# Patient Record
Sex: FEMALE | Race: BLACK OR AFRICAN AMERICAN | NOT HISPANIC OR LATINO | URBAN - METROPOLITAN AREA
[De-identification: names, ages, dates, MRNs, and addresses within clinical notes are randomized per-mention and may not be internally consistent; named-entity substitution may affect disease eponyms.]

---
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
## 2018-07-25 ENCOUNTER — HOSPITAL ENCOUNTER (EMERGENCY)
Facility: HOSPITAL | Age: 18
Discharge: HOME | End: 2018-07-25
Attending: EMERGENCY MEDICINE | Admitting: EMERGENCY MEDICINE
Payer: COMMERCIAL

## 2018-07-25 ENCOUNTER — APPOINTMENT (EMERGENCY)
Dept: RADIOLOGY | Facility: HOSPITAL | Age: 18
End: 2018-07-25
Payer: COMMERCIAL

## 2018-07-25 VITALS
TEMPERATURE: 98.1 F | OXYGEN SATURATION: 100 % | SYSTOLIC BLOOD PRESSURE: 119 MMHG | HEIGHT: 64 IN | WEIGHT: 112.88 LBS | DIASTOLIC BLOOD PRESSURE: 74 MMHG | BODY MASS INDEX: 19.27 KG/M2 | RESPIRATION RATE: 17 BRPM | HEART RATE: 68 BPM

## 2018-07-25 DIAGNOSIS — M25.562 LEFT ANTERIOR KNEE PAIN: Primary | ICD-10-CM

## 2018-07-25 PROCEDURE — 99283 EMERGENCY DEPT VISIT LOW MDM: CPT

## 2018-07-25 PROCEDURE — 73562 X-RAY EXAM OF KNEE 3: CPT

## 2018-07-25 RX ORDER — IBUPROFEN 400 MG/1
400 TABLET ORAL ONCE
Status: COMPLETED | OUTPATIENT
Start: 2018-07-25 | End: 2018-07-25

## 2018-07-25 RX ADMIN — IBUPROFEN 400 MG: 400 TABLET ORAL at 10:44

## 2018-07-25 NOTE — DISCHARGE INSTRUCTIONS
Wear the Ace wrap while walking around  Keep her leg elevated, and apply ice while at rest   Take ibuprofen (Motrin, Advil) or acetaminophen (Tylenol) as needed for pain, as per the instructions  Knee Pain   WHAT YOU NEED TO KNOW:   Knee pain may start suddenly, or it may be a long-term problem  You may have pain on the side, front, or back of your knee  You may have knee stiffness and swelling  You may hear popping sounds or feel like your knee is giving way or locking up as you walk  You may feel pain when you sit, stand, walk, or climb up and down stairs  Knee pain can be caused by conditions such as obesity, inflammation, or strains or tears in ligaments or tendons  DISCHARGE INSTRUCTIONS:   Follow up with your healthcare provider within 24 hours or as directed: You may need follow-up treatments, such as steroid injections to decrease pain  Write down your questions so you remember to ask them during your visits  Self-care:   · Rest  your knee so it can heal  Limit activities that increase your pain  · Ice  can help reduce swelling  Wrap ice in a towel and put it on your knee for as long and as often as directed  · Compression  with a brace or bandage can help reduce swelling  Use a brace or bandage only as directed  · Elevation  helps decrease pain and swelling  Elevate your knee while you are sitting or lying down  Prop your leg on pillows to keep your knee above the level of your heart  Medicines:   · NSAIDs  help decrease swelling and pain or fever  This medicine is available with or without a doctor's order  NSAIDs can cause stomach bleeding or kidney problems in certain people  If you take blood thinner medicine, always ask your healthcare provider if NSAIDs are safe for you  Always read the medicine label and follow directions  · Acetaminophen  decreases pain and fever  It is available without a doctor's order  Ask how much to take and when to take it  Follow directions  Acetaminophen can cause liver damage if not taken correctly  · Take your medicine as directed  Contact your healthcare provider if you think your medicine is not helping or if you have side effects  Tell him or her if you are allergic to any medicine  Keep a list of the medicines, vitamins, and herbs you take  Include the amounts, and when and why you take them  Bring the list or the pill bottles to follow-up visits  Carry your medicine list with you in case of an emergency  Exercise as directed: You may need to see a physical therapist or do recommended exercises to improve movement and decrease your pain  You may be directed to walk, swim, or ride a bike  Follow your exercise plan exactly as directed to avoid further injury  Contact your healthcare provider if:   · You have questions or concerns about your condition or care  Return to the emergency department if:   · Your pain is worse, even after treatment  · You cannot bend or straighten your leg completely  · The swelling around your knee does not go down even with treatment  · Your knee is painful and hot to the touch  © 2017 [employer]  Information is for End User's use only and may not be sold, redistributed or otherwise used for commercial purposes  All illustrations and images included in CareNotes® are the copyrighted property of [employer]  or [employer]  The above information is an  only  It is not intended as medical advice for individual conditions or treatments  Talk to your doctor, nurse or pharmacist before following any medical regimen to see if it is safe and effective for you

## 2018-07-25 NOTE — ED PROVIDER NOTES
History  Chief Complaint   Patient presents with    Knee Pain     Pt reports left knee pain that began two days ago, denies any initial injury  HPI    None       History reviewed  No pertinent past medical history  History reviewed  No pertinent surgical history  History reviewed  No pertinent family history  I have reviewed and agree with the history as documented  Social History   Substance Use Topics    Smoking status: Never Smoker    Smokeless tobacco: Never Used    Alcohol use No        Review of Systems    Physical Exam  Physical Exam   Constitutional: She is oriented to person, place, and time  She appears well-developed and well-nourished  No distress  HENT:   Head: Normocephalic and atraumatic  Eyes: Conjunctivae are normal  Pupils are equal, round, and reactive to light  Neck: Normal range of motion  No tracheal deviation present  Cardiovascular: Normal rate, regular rhythm and intact distal pulses  Pulses:       Dorsalis pedis pulses are 2+ on the right side  Pulmonary/Chest: Effort normal  No respiratory distress  Musculoskeletal: She exhibits no edema  Right knee: She exhibits decreased range of motion (slightly limited flexion due to pain ) and swelling (mild, anterior)  She exhibits no effusion, no ecchymosis, no deformity, no erythema, no LCL laxity, normal patellar mobility and no MCL laxity  Tenderness (anterior) found  No patellar tendon tenderness noted  Neurological: She is alert and oriented to person, place, and time  GCS eye subscore is 4  GCS verbal subscore is 5  GCS motor subscore is 6  Skin: Skin is warm and dry  Psychiatric: She has a normal mood and affect  Her behavior is normal    Nursing note and vitals reviewed        Vital Signs  ED Triage Vitals   Temperature Pulse Respirations Blood Pressure SpO2   07/25/18 0910 07/25/18 0908 07/25/18 0908 07/25/18 0908 07/25/18 0908   98 1 °F (36 7 °C) 68 17 119/74 100 %      Temp Source Heart Rate Source Patient Position - Orthostatic VS BP Location FiO2 (%)   07/25/18 0910 07/25/18 0908 07/25/18 0908 07/25/18 0908 --   Oral Monitor Lying Right arm       Pain Score       07/25/18 0908       Worst Possible Pain           Vitals:    07/25/18 0908   BP: 119/74   Pulse: 68   Patient Position - Orthostatic VS: Lying       Visual Acuity      ED Medications  Medications   ibuprofen (MOTRIN) tablet 400 mg (not administered)       Diagnostic Studies  Results Reviewed     None                 XR knee 3 views left non injury    (Results Pending)              Procedures  Procedures       Phone Contacts  ED Phone Contact    ED Course                               MDM  Number of Diagnoses or Management Options  Left anterior knee pain: new and requires workup  Diagnosis management comments: This is a 47-year-old female who presents here today with left knee pain  She states a couple of mornings ago she woke up with pain and swelling, with any feeling "stiff, and hard to bend  She states she iced it throughout the day and it felt better by the evening  She was fine for a day or two, but woke up yesterday with recurrent pain  She states despite icing it she is still having pain, swelling, and difficulties moving it this morning  She states it hurts when walking around on it  She denies any known injuries to the knee  She denies any prior problems to the knee, or any issues with any other joints  She denies any redness, fevers, rashes, other systemic symptoms  She is two weeks postpartum, and had no problems with pregnancy or delivery  There is no calf pain or tenderness, and she states all of her pain is anterior  She has taken Motrin twice with mild improvement of the pain  Her persistent pain this morning is what prompted her to come in for evaluation today  ROS: Otherwise negative, unless stated as above  She is well-appearing, in no acute distress  She has anterior swelling with diffuse anterior tenderness  There is no erythema or warmth  She does have tenderness with range of motion of the knee, and is slightly limited with flexion due to pain  There is no ligamentous laxity  There is no calf tenderness or swelling  She is neurovascularly intact distally  This is most likely sprain or strain, or other unrecognized injury to the knee  I am not concerned about DVT or septic arthritis  X-rays were obtained and reviewed by myself, and show some soft tissue swelling, but no bony abnormalities or significant effusion  I discussed with the patient findings, treatment at home, follow-up, and indications for return, and she expresses understanding with this plan  Amount and/or Complexity of Data Reviewed  Tests in the radiology section of CPT®: ordered and reviewed  Independent visualization of images, tracings, or specimens: yes      CritCare Time    Disposition  Final diagnoses:   Left anterior knee pain     Time reflects when diagnosis was documented in both MDM as applicable and the Disposition within this note     Time User Action Codes Description Comment    7/25/2018 10:41 AM Jose Thomas Forward Add [M25 [id number]] Left anterior knee pain       ED Disposition     ED Disposition Condition Comment    Discharge  Aura Marks discharge to home/self care  Condition at discharge: Good        Follow-up Information     Follow up With Specialties Details Why [facility] Schedule an appointment as soon as possible for a visit to follow up on your knee [address] [facility] [address] [address]  [phone]          Patient's Medications    No medications on file     No discharge procedures on file      ED Provider  Electronically Signed by           Art Feliz MD  07/29/18 5887

## 2019-04-30 ENCOUNTER — HOSPITAL ENCOUNTER (EMERGENCY)
Facility: HOSPITAL | Age: 19
Discharge: HOME | End: 2019-04-30
Attending: EMERGENCY MEDICINE | Admitting: EMERGENCY MEDICINE
Payer: COMMERCIAL

## 2019-04-30 VITALS
HEART RATE: 106 BPM | BODY MASS INDEX: 18.92 KG/M2 | TEMPERATURE: 99.2 F | WEIGHT: 110.23 LBS | DIASTOLIC BLOOD PRESSURE: 67 MMHG | RESPIRATION RATE: 18 BRPM | SYSTOLIC BLOOD PRESSURE: 132 MMHG | OXYGEN SATURATION: 100 %

## 2019-04-30 DIAGNOSIS — J11.1 INFLUENZA-LIKE ILLNESS: Primary | ICD-10-CM

## 2019-04-30 PROCEDURE — 99283 EMERGENCY DEPT VISIT LOW MDM: CPT

## 2019-04-30 PROCEDURE — 99283 EMERGENCY DEPT VISIT LOW MDM: CPT | Performed by: PHYSICIAN ASSISTANT

## 2019-04-30 RX ORDER — IBUPROFEN 600 MG/1
600 TABLET ORAL EVERY 6 HOURS PRN
Qty: 30 TABLET | Refills: 0 | Status: SHIPPED | OUTPATIENT
Start: 2019-04-30 | End: 2021-05-01

## 2019-04-30 RX ORDER — FEXOFENADINE HCL AND PSEUDOEPHEDRINE HCI 60; 120 MG/1; MG/1
1 TABLET, EXTENDED RELEASE ORAL 2 TIMES DAILY PRN
Qty: 14 TABLET | Refills: 0 | Status: SHIPPED | OUTPATIENT
Start: 2019-04-30 | End: 2019-05-07

## 2019-04-30 RX ORDER — GUAIFENESIN AND CODEINE PHOSPHATE 100; 10 MG/5ML; MG/5ML
5 SOLUTION ORAL 3 TIMES DAILY PRN
Qty: 90 ML | Refills: 0 | Status: SHIPPED | OUTPATIENT
Start: 2019-04-30 | End: 2019-05-07

## 2019-04-30 RX ORDER — ONDANSETRON 4 MG/1
4 TABLET, ORALLY DISINTEGRATING ORAL EVERY 6 HOURS PRN
Qty: 12 TABLET | Refills: 0 | Status: SHIPPED | OUTPATIENT
Start: 2019-04-30 | End: 2021-05-01

## 2021-04-24 ENCOUNTER — HOSPITAL ENCOUNTER (EMERGENCY)
Facility: HOSPITAL | Age: 21
Discharge: HOME | End: 2021-04-24
Attending: EMERGENCY MEDICINE | Admitting: EMERGENCY MEDICINE
Payer: COMMERCIAL

## 2021-04-24 VITALS
WEIGHT: 118 LBS | OXYGEN SATURATION: 98 % | HEART RATE: 85 BPM | RESPIRATION RATE: 16 BRPM | SYSTOLIC BLOOD PRESSURE: 110 MMHG | TEMPERATURE: 98.3 F | DIASTOLIC BLOOD PRESSURE: 68 MMHG | BODY MASS INDEX: 20.25 KG/M2

## 2021-04-24 DIAGNOSIS — Z11.52 ENCOUNTER FOR SCREENING FOR COVID-19: Primary | ICD-10-CM

## 2021-04-24 LAB
EXT PREG TEST URINE: NEGATIVE
EXT. CONTROL ED NAV: NORMAL
SARS-COV-2 RNA RESP QL NAA+PROBE: NEGATIVE

## 2021-04-24 PROCEDURE — 96372 THER/PROPH/DIAG INJ SC/IM: CPT

## 2021-04-24 PROCEDURE — 99284 EMERGENCY DEPT VISIT MOD MDM: CPT | Performed by: PHYSICIAN ASSISTANT

## 2021-04-24 PROCEDURE — U0003 INFECTIOUS AGENT DETECTION BY NUCLEIC ACID (DNA OR RNA); SEVERE ACUTE RESPIRATORY SYNDROME CORONAVIRUS 2 (SARS-COV-2) (CORONAVIRUS DISEASE [COVID-19]), AMPLIFIED PROBE TECHNIQUE, MAKING USE OF HIGH THROUGHPUT TECHNOLOGIES AS DESCRIBED BY CMS-2020-01-R: HCPCS | Performed by: PHYSICIAN ASSISTANT

## 2021-04-24 PROCEDURE — 99284 EMERGENCY DEPT VISIT MOD MDM: CPT

## 2021-04-24 PROCEDURE — U0005 INFEC AGEN DETEC AMPLI PROBE: HCPCS | Performed by: PHYSICIAN ASSISTANT

## 2021-04-24 PROCEDURE — 81025 URINE PREGNANCY TEST: CPT | Performed by: PHYSICIAN ASSISTANT

## 2021-04-24 RX ORDER — KETOROLAC TROMETHAMINE 30 MG/ML
15 INJECTION, SOLUTION INTRAMUSCULAR; INTRAVENOUS ONCE
Status: COMPLETED | OUTPATIENT
Start: 2021-04-24 | End: 2021-04-24

## 2021-04-24 RX ADMIN — KETOROLAC TROMETHAMINE 15 MG: 30 INJECTION, SOLUTION INTRAMUSCULAR at 13:22

## 2021-04-24 NOTE — Clinical Note
Lesvia Neff was seen and treated in our emergency department on 4/24/2021  Diagnosis:     Valery Nunez  may return to work on return date  She may return on this date: 04/27/2021         If you have any questions or concerns, please don't hesitate to call        Lola Omer PA-C    ______________________________           _______________          _______________  Hospital Representative                              Date                                Time

## 2021-04-24 NOTE — DISCHARGE INSTRUCTIONS
You will be notified of COVID test results by telephone call  Encouraged strong hydration  If symptoms continue please follow-up with your primary care provider  Return to the emergency department if you experience any new or worsening symptoms including abdominal pain, intractable vomiting or diarrhea, fevers, blood in stool, or any other worrisome symptoms

## 2021-04-24 NOTE — RESULT ENCOUNTER NOTE
Patient notified of negative covid test result by telephone call  She states that she received her [employer] vaccine 3 weeks ago  Callback complete

## 2021-04-26 NOTE — ED PROVIDER NOTES
History  Chief Complaint   Patient presents with    Abdominal Pain     stomach and headache for the past two days, brother had similar symptoms earlier this week and got tested for covid but the test hasn't come back yet     Rambo Quiroz is an otherwise healthy and well-appearing 56-year-old female arriving ambulatory to the emergency department with complaint of flu-like symptoms x 2 days  Patient reports generalized frontal headache, and episodes of nonbloody diarrhea  She presently denies nausea or abdominal pain  No prior abdominal surgical history  No known unusual foods, recent antibiotics, or recent travel  She denies fever, chills, neck pain or stiffness, or loss of sense of smell or taste  She states that she has been tolerating p o  intake since symptom onset without episodes of vomiting, though has been maintaining a rather bland diet  She denies any urinary issues or difficulty with urination  She has no related focal deficits in relation to headache  The patient works in a  and had received both Venturep[employer] covid vaccinations approximately 3 weeks ago  She does however express concern about the possibility of tin COVID-19 infection as she reports there was a recent outbreak at her younger brother's school, stating that her younger brother and sister have similar symptoms currently  She denies any shortness of breath or any chest pain  History provided by:  Patient  Diarrhea  Quality:  Watery  Severity:  Mild  Onset quality:  Gradual  Progression:  Unchanged  Relieved by:  None tried  Ineffective treatments:  None tried  Associated symptoms: headaches    Associated symptoms: no abdominal pain, no chills, no diaphoresis, no fever and no vomiting    Risk factors: sick contacts    Risk factors: no recent antibiotic use, no suspicious food intake and no travel to endemic areas        Prior to Admission Medications   Prescriptions Last Dose Informant Patient Reported? Taking? ibuprofen (MOTRIN) 600 mg tablet   No No   Sig: Take 1 tablet (600 mg total) by mouth every 6 (six) hours as needed for moderate pain or headaches (myalgias/body aches/headaches) for up to 5 days   ondansetron (ZOFRAN-ODT) 4 mg disintegrating tablet   No No   Sig: Take 1 tablet (4 mg total) by mouth every 6 (six) hours as needed for nausea or vomiting      Facility-Administered Medications: None       History reviewed  No pertinent past medical history  Past Surgical History:   Procedure Laterality Date    BREAST SURGERY      benign tumor       History reviewed  No pertinent family history  I have reviewed and agree with the history as documented  E-Cigarette/Vaping     E-Cigarette/Vaping Substances     Social History     Tobacco Use    Smoking status: Never Smoker    Smokeless tobacco: Never Used   Substance Use Topics    Alcohol use: No    Drug use: No       Review of Systems   Constitutional: Negative for chills, diaphoresis and fever  Respiratory: Negative for chest tightness  Gastrointestinal: Positive for diarrhea  Negative for abdominal pain and vomiting  Skin: Negative for pallor  Neurological: Positive for headaches  Negative for weakness and numbness  All other systems reviewed and are negative  Physical Exam  Physical Exam  Vitals signs and nursing note reviewed  Constitutional:       General: She is not in acute distress  Appearance: Normal appearance  She is well-developed  She is not ill-appearing, toxic-appearing or diaphoretic  HENT:      Head: Normocephalic and atraumatic  Mouth/Throat:      Lips: Pink  Mouth: Mucous membranes are moist       Pharynx: Oropharynx is clear  Uvula midline  Comments: Moist oral mucosa  Eyes:      Conjunctiva/sclera: Conjunctivae normal       Pupils: Pupils are equal, round, and reactive to light  Neck:      Musculoskeletal: Normal range of motion and neck supple  No neck rigidity or muscular tenderness  Comments: No meningismus  Cardiovascular:      Rate and Rhythm: Normal rate and regular rhythm  Heart sounds: Normal heart sounds  Pulmonary:      Effort: Pulmonary effort is normal  No respiratory distress  Breath sounds: Normal breath sounds  No wheezing  Abdominal:      General: Bowel sounds are normal  There is no distension  Palpations: Abdomen is soft  Tenderness: There is no abdominal tenderness  There is no guarding or rebound  Comments: Abd soft, non-tender, non-distended  No peritoneal signs  Musculoskeletal: Normal range of motion  General: No tenderness  Skin:     General: Skin is warm and dry  Capillary Refill: Capillary refill takes less than 2 seconds  Neurological:      General: No focal deficit present  Mental Status: She is alert  Mental status is at baseline  Cranial Nerves: Cranial nerves are intact  Sensory: Sensation is intact  Motor: Motor function is intact  Coordination: Coordination is intact  Gait: Gait is intact  Vital Signs  ED Triage Vitals [04/24/21 1307]   Temperature Pulse Respirations Blood Pressure SpO2   98 3 °F (36 8 °C) 85 16 110/68 98 %      Temp Source Heart Rate Source Patient Position - Orthostatic VS BP Location FiO2 (%)   Oral Monitor Sitting Right arm --      Pain Score       --           Vitals:    04/24/21 1307   BP: 110/68   Pulse: 85   Patient Position - Orthostatic VS: Sitting         Visual Acuity      ED Medications  Medications   ketorolac (TORADOL) injection 15 mg (15 mg Intramuscular Given 4/24/21 1322)       Diagnostic Studies  Results Reviewed     Procedure Component Value Units Date/Time    Novel Coronavirus (Covid-19),PCR [facility] - 24 Hour Routine [[id number]]  (Normal) Collected: 04/24/21 1322    Lab Status: Final result Specimen: Nares from Nose Updated: 04/24/21 1425     SARS-CoV-2 Negative    Narrative:       The specimen collection materials, transport medium, and/or testing methodology utilized in the production of these test results have been proven to be reliable in a limited validation with an abbreviated program under the Emergency Utilization Authorization provided by the FDA  Testing reported as "Presumptive positive" will be confirmed with secondary testing to ensure result accuracy  Clinical caution and judgement should be used with the interpretation of these results with consideration of the clinical impression and other laboratory testing  Testing reported as "Positive" or "Negative" has been proven to be accurate according to standard laboratory validation requirements  All testing is performed with control materials showing appropriate reactivity at standard intervals  POCT pregnancy, urine [[id number]]  (Normal) Resulted: 04/24/21 1335    Lab Status: Final result Updated: 04/24/21 1335     EXT PREG TEST UR (Ref: Negative) negative     Control vaild                 No orders to display              Procedures  Procedures         ED Course                             SBIRT 20yo+      Most Recent Value   SBIRT (24 yo +)   In order to provide better care to our patients, we are screening all of our patients for alcohol and drug use  Would it be okay to ask you these screening questions? Yes Filed at: 04/24/2021 1335   Initial Alcohol Screen: US AUDIT-C    1  How often do you have a drink containing alcohol?  0 Filed at: 04/24/2021 1335   2  How many drinks containing alcohol do you have on a typical day you are drinking? 0 Filed at: 04/24/2021 1335   3a  Male UNDER 65: How often do you have five or more drinks on one occasion? 0 Filed at: 04/24/2021 1335   3b  FEMALE Any Age, or MALE 65+: How often do you have 4 or more drinks on one occassion? 0 Filed at: 04/24/2021 1335   Audit-C Score  0 Filed at: 04/24/2021 1335   RODOLFO: How many times in the past year have you    Used an illegal drug or used a prescription medication for non-medical reasons?   Never Filed at: 04/24/2021 1335                    [facility]  Number of Diagnoses or Management Options  Encounter for screening for COVID-19: new and requires workup  Diagnosis management comments: This is a 49-year-old well-appearing female arriving ambulatory to the emergency department for evaluation of flu-like illness x2 days  She reports experiencing frontal headache as well as episodes of nonbloody diarrhea over the past 2 days  The patient is fully immunized against COVID-19, stating her 2nd vaccine was approximately 3 weeks ago  She does admit concern about possibly tin COVID-19 infection as the patient's younger brother had been exposed at school, stating that the patient's younger brother and sister are demonstrating similar symptoms  The patient has no focal deficits, no chest pain or shortness of breath  No fevers, chills, neck pain or stiffness  She denies any recent travel, recent antibiotic use, or suspicious food intake  She has not tried anything for symptom relief thus far, though she has been able to tolerate p o  intake since symptom onset  Differential diagnosis includes but not limited to: screen for covid; viral enteritis, colitis, tension headache; neuro exam is non-focal without indication to pursue neuroimaging at this time; the patient is clinically appearing without exam findings concerning for dehydration as she has demonstrated ability to tolerate po intake in the ED; her abdominal exam is entirely benign and not suggestive of acute intra-abdominal surgical process or significant intra-abdominal infection    Initial ED plan: covid swab    Final ED Assessment:  Vital signs stable on hospital arrival, patient afebrile and nontoxic in appearance  Examination as documented above which is largely unremarkable, as there was no reproducible abdominal tenderness to palpation or peritoneal signs, and no focal neurologic deficits    Patient otherwise well-appearing and in no acute distress  No clinical evidence of dehydration at this time  Her vital signs, normal examination, and ability to tolerate po are reassuring  Suspect probable viral enteritis/colitis, COVID swab obtained for further evaluation  The patient's COVID test was negative  I personally called the patient to update her testing results  Encouraged supportive care including strong hydration w/ bland diet, advancing as tolerated, and Tylenol/ibuprofen as needed for headaches  She was encouraged to return immediately with any new or worsening symptoms including but not limited to severe headache, severe abdominal pain, intractable nausea or vomiting, blood in the patient's stool, weakness, or any other new or worrisome symptoms  The patient had verbalized understanding and was agreeable with disposition plan  She is stable for hospital discharge at this time  The patient had ambulated independently from the department with stable and steady gait observed  Amount and/or Complexity of Data Reviewed  Clinical lab tests: ordered and reviewed  Review and summarize past medical records: yes  Independent visualization of images, tracings, or specimens: yes    Risk of Complications, Morbidity, and/or Mortality  Presenting problems: low  Diagnostic procedures: low  Management options: low    Patient Progress  Patient progress: stable      Disposition  Final diagnoses:   Encounter for screening for COVID-19     Time reflects when diagnosis was documented in both MDM as applicable and the Disposition within this note     Time User Action Codes Description Comment    4/24/2021  1:20 PM Kiesha Leal Add [Z11 52] Encounter for screening for COVID-19       ED Disposition     ED Disposition Condition Date/Time Comment    Discharge Stable Sat Apr 24, 2021 [address] discharge to home/self care              Follow-up Information     Follow up With Specialties Details Why Contact Info Additional Information    Your Primary Care Provider   As needed      [facility]  If symptoms worsen [address] Emergency Department, [address] [place] [address], [phone]          Discharge Medication List as of 4/24/2021  1:30 PM      CONTINUE these medications which have NOT CHANGED    Details   ibuprofen (MOTRIN) 600 mg tablet Take 1 tablet (600 mg total) by mouth every 6 (six) hours as needed for moderate pain or headaches (myalgias/body aches/headaches) for up to 5 days, Starting Tue 4/30/2019, Until Sun 5/5/2019, Print      ondansetron (ZOFRAN-ODT) 4 mg disintegrating tablet Take 1 tablet (4 mg total) by mouth every 6 (six) hours as needed for nausea or vomiting, Starting Tue 4/30/2019, Print           No discharge procedures on file      PDMP Review     None          ED Provider  Electronically Signed by           Cheryl Mcclure PA-C  04/26/21 1749

## 2021-05-01 ENCOUNTER — HOSPITAL ENCOUNTER (EMERGENCY)
Facility: HOSPITAL | Age: 21
Discharge: HOME | End: 2021-05-01
Attending: EMERGENCY MEDICINE | Admitting: EMERGENCY MEDICINE
Payer: COMMERCIAL

## 2021-05-01 VITALS
HEART RATE: 94 BPM | RESPIRATION RATE: 18 BRPM | OXYGEN SATURATION: 100 % | SYSTOLIC BLOOD PRESSURE: 125 MMHG | DIASTOLIC BLOOD PRESSURE: 89 MMHG | TEMPERATURE: 98.1 F | BODY MASS INDEX: 20.25 KG/M2 | WEIGHT: 117.95 LBS

## 2021-05-01 DIAGNOSIS — U07.1 COVID-19: Primary | ICD-10-CM

## 2021-05-01 LAB — SARS-COV-2 RNA RESP QL NAA+PROBE: POSITIVE

## 2021-05-01 PROCEDURE — 99283 EMERGENCY DEPT VISIT LOW MDM: CPT

## 2021-05-01 PROCEDURE — U0005 INFEC AGEN DETEC AMPLI PROBE: HCPCS | Performed by: EMERGENCY MEDICINE

## 2021-05-01 PROCEDURE — U0003 INFECTIOUS AGENT DETECTION BY NUCLEIC ACID (DNA OR RNA); SEVERE ACUTE RESPIRATORY SYNDROME CORONAVIRUS 2 (SARS-COV-2) (CORONAVIRUS DISEASE [COVID-19]), AMPLIFIED PROBE TECHNIQUE, MAKING USE OF HIGH THROUGHPUT TECHNOLOGIES AS DESCRIBED BY CMS-2020-01-R: HCPCS | Performed by: EMERGENCY MEDICINE

## 2021-05-01 PROCEDURE — 99284 EMERGENCY DEPT VISIT MOD MDM: CPT | Performed by: EMERGENCY MEDICINE

## 2021-05-14 NOTE — ED PROVIDER NOTES
History  Chief Complaint   Patient presents with    URI     patient c/o congestion, watery eyes x 1 week  Last night had a fever on 100 5 and this morn 99 7  States she lost her sense of tase and smell  She was vaccinated 3 weeks ago  +exposure to COVID was tested last week and was negative      20 yo f presenting to the emergency department for eval of uri sx  Pt has had cough , congestion and runny nose progressie x 1 week  Also reports loss of taste and smell  Had 1 round of covid vaccine but also expoisure  Fever of 100 5 earlier  No sob or chest pain/tightness  None       History reviewed  No pertinent past medical history  Past Surgical History:   Procedure Laterality Date    BREAST SURGERY      benign tumor       History reviewed  No pertinent family history  I have reviewed and agree with the history as documented  E-Cigarette/Vaping    E-Cigarette Use Never User      E-Cigarette/Vaping Substances    Nicotine No     THC No     CBD No     Flavoring No     Other No     Unknown No      Social History     Tobacco Use    Smoking status: Never Smoker    Smokeless tobacco: Never Used   Substance Use Topics    Alcohol use: No    Drug use: No       Review of Systems   Constitutional: Positive for fever  Negative for appetite change, chills and fatigue  HENT: Positive for congestion  Negative for sneezing and sore throat  Eyes: Negative for visual disturbance  Respiratory: Positive for cough  Negative for choking, chest tightness, shortness of breath and wheezing  Cardiovascular: Negative for chest pain and palpitations  Gastrointestinal: Negative for abdominal pain, constipation, diarrhea, nausea and vomiting  Genitourinary: Negative for difficulty urinating and dysuria  Neurological: Negative for dizziness, weakness, light-headedness, numbness and headaches  All other systems reviewed and are negative        Physical Exam  Physical Exam  Vitals signs and nursing note reviewed  Constitutional:       General: She is not in acute distress  Appearance: She is well-developed  She is not diaphoretic  HENT:      Head: Normocephalic and atraumatic  Eyes:      Pupils: Pupils are equal, round, and reactive to light  Neck:      Vascular: No JVD  Trachea: No tracheal deviation  Cardiovascular:      Rate and Rhythm: Normal rate and regular rhythm  Heart sounds: Normal heart sounds  No murmur  No friction rub  No gallop  Pulmonary:      Effort: Pulmonary effort is normal  No respiratory distress  Breath sounds: Normal breath sounds  No wheezing or rales  Abdominal:      General: Bowel sounds are normal  There is no distension  Palpations: Abdomen is soft  Tenderness: There is no abdominal tenderness  There is no guarding or rebound  Skin:     General: Skin is warm and dry  Coloration: Skin is not pale  Neurological:      Mental Status: She is alert and oriented to person, place, and time  Cranial Nerves: No cranial nerve deficit  Motor: No abnormal muscle tone  Psychiatric:         Behavior: Behavior normal          Vital Signs  ED Triage Vitals [05/01/21 2054]   Temperature Pulse Respirations Blood Pressure SpO2   98 1 °F (36 7 °C) 94 18 125/89 100 %      Temp Source Heart Rate Source Patient Position - Orthostatic VS BP Location FiO2 (%)   Temporal Monitor Sitting Right arm --      Pain Score       --           Vitals:    05/01/21 2054   BP: 125/89   Pulse: 94   Patient Position - Orthostatic VS: Sitting         Visual Acuity      ED Medications  Medications - No data to display    Diagnostic Studies  Results Reviewed     Procedure Component Value Units Date/Time    [facility][id number]  (Abnormal) Collected: 05/01/21 2129    Lab Status: Final result Specimen: Nares from Nasopharyngeal Swab Updated: 05/01/21 2221     SARS-CoV-2 Positive    Narrative:       The specimen collection materials, transport medium, and/or testing methodology utilized in the production of these test results have been proven to be reliable in a limited validation with an abbreviated program under the Emergency Utilization Authorization provided by the FDA  Testing reported as "Presumptive positive" will be confirmed with secondary testing to ensure result accuracy  Clinical caution and judgement should be used with the interpretation of these results with consideration of the clinical impression and other laboratory testing  Testing reported as "Positive" or "Negative" has been proven to be accurate according to standard laboratory validation requirements  All testing is performed with control materials showing appropriate reactivity at standard intervals  No orders to display              Procedures  Procedures         ED Course                             SBIRT 22yo+      Most Recent Value   SBIRT (22 yo +)   In order to provide better care to our patients, we are screening all of our patients for alcohol and drug use  Would it be okay to ask you these screening questions? Yes Filed at: 05/01/2021 2118   Initial Alcohol Screen: US AUDIT-C    1  How often do you have a drink containing alcohol?  0 Filed at: 05/01/2021 2118   2  How many drinks containing alcohol do you have on a typical day you are drinking? 0 Filed at: 05/01/2021 2118   3a  Male UNDER 65: How often do you have five or more drinks on one occasion? 0 Filed at: 05/01/2021 2118   3b  FEMALE Any Age, or MALE 65+: How often do you have 4 or more drinks on one occassion? 0 Filed at: 05/01/2021 2118   Audit-C Score  0 Filed at: 05/01/2021 2118   RODOLFO: How many times in the past year have you    Used an illegal drug or used a prescription medication for non-medical reasons?   Never Filed at: 05/01/2021 2118                    [facility]  Number of Diagnoses or Management Options  COVID-19:   Diagnosis management comments: 18 yo f with uri/viral syndrome, will covid, reassess    Pt is covid positive, well appearing here, pt given home care instructions and return precautions  Disposition  Final diagnoses:   [id number]     Time reflects when diagnosis was documented in both MDM as applicable and the Disposition within this note     Time User Action Codes Description Comment    5/1/2021 10:30 PM Dylan Peres Add [U07 1] COVID-19       ED Disposition     ED Disposition Condition Date/Time Comment    Discharge Stable Sat May 1, 2021 10:[address]  [address] discharge to home/self care  Follow-up Information    None         There are no discharge medications for this patient  No discharge procedures on file      PDMP Review     None          ED Provider  Electronically Signed by           Sarah Rausch MD  05/13/21 6935

## 2021-07-19 NOTE — ED ADULT NURSE NOTE - CHPI ED SYMPTOMS POS
CARDIOLOGY PROGRESS note      History    Rogelio Khan is a 58 Outpatient Medications   Medication Sig   • allopurinol (ZYLOPRIM) 100 MG tablet Take 2 tablets by mouth daily.   • Xarelto 20 MG Tab TAKE 1 TABLET BY MOUTH ONCE DAILY WITH  DINNER   • Cartia  MG 24 hr capsule Take 1 capsule by mouth once daily   • metoPROLOL succinate (TOPROL-XL) 100 MG 24 hr tablet TAKE 1 & 1/2 (ONE & ONE-HALF) TABLETS BY MOUTH TWICE DAILY   • atorvastatin (LIPITOR) 20 MG tablet Take 1 tablet by mouth once daily   • vitamin - therapeutic multivitamins w/minerals (CENTRUM SILVER,THERA-M) TABS Take 1 tablet by mouth daily.     • indomethacin (INDOCIN) 50 MG capsule 1 capsule 3 times a day for 1 week     No current facility-administered medications for this visit.       aLLERGIES    ALLERGIES:  No Known Allergies    Physical Exam    Vital Signs:    Vitals:    07/20/21 0825   BP: 128/86   Pulse: 74   Resp: 18   Weight: 133.6 kg   Height: 6' 4\" (1.93 m)       General:  NAD, pleasant, alert and oriented x3.  Neck:  No carotid bruits.  No JVD (jugular venous distension).  Normal carotid upstroke.  Pulmonary:  No retractions or increased work of breathing.  Clear to auscultation bilaterally.  No crackles or wheezing.  Cardiovascular:   Irregularly irregular with variable S1 and normal S2 with no S3 appreciated.  There are no murmurs.  No rubs.  No gallops.  PMI (point of maximal impulse) is nondisplaced.    Abdomen:  Bowel sounds normoactive.  Soft, nontender, nondistended.  No hepatosplenomegaly.  Extremities:  No edema or cyanosis.     Glucose (mg/dL)   Date Value   05/11/2021 83       Cholesterol (mg/dL)   Date Value   09/22/2020 255 (H)     HDL (mg/dL)   Date Value   09/22/2020 41     Cholesterol/ HDL Ratio (no units)   Date Value   09/22/2020 6.2 (H)     Triglycerides (mg/dL)   Date Value   09/22/2020 201 (H)     LDL (mg/dL)   Date Value   09/22/2020 174 (H)       Creatinine (mg/dL)   Date Value   05/11/2021 1.07     BUN (mg/dL)   Date Value   05/11/2021 13     INR-POC (no units)   Date Value    06/28/2018 1.8     CHADS VASc Score   Congestive Heart Failure: 0 (YES: 1, NO: 0)   Hypertension: 1 (YES: 1, NO: 0)   Age: 0 (<65: 0, 65-74: 1, >75: 2)   Diabetes Mellitus: 0 (YES: 1, NO: 0)   Stroke/TIA/Thromboembolism: 0 (YES: 2, NO: 0)   Vascular Disease: 0 (YES: 1, NO: 0)   Sex: 0 (Male: 0, Female: 1)   Total Score: 1  CHADSVASC clinical risk estimation. Adapted from Gracie et al.     [id number]   SCORE  ADJUSTED STROKE RATE (% year)    0  0%    1  1,3%    2  2,2%    3  3,2%    4  4,0%    5  6,7%    6  9,8%    7  9,6%    8  6,7%    9  15,2%       EKG 10/28/2019:  Atrial fibrillation   Abnormal ECG     Assessment  1.  Chronic atrial fibrillation, anticoagulation with Xarelto.   2.  Hypertension.  3. COVID-19 positive 11/2020    PLAN  Rogelio Khan is a pleasant 58 year old white gentleman with history of chronic atrial fibrillation and hypertension who isn't having any complaints of chest pain or shortness of breath. His blood pressure is stable. Echocardiogram on 10/23/2017 revealed improved LV function with an EF of 50% which improved from 45%.  Stress test on 11/12/2018 revealed no evidence for ischemia and high work level achieved. The patient has seen Dr. Hardwick in the past. He did not want to undergo an ablation or cardioversion. He is anticoagulated with Xarelto 20 mg once daily. His kidney function was stable on 05/11/2021. Lipids on 09/22/2020 were elevated with LDL of 174. I have ordered CT Cardiac Calcium Scoring to be done in a few weeks. The patient is stable from a cardiac standpoint.  The patient is going to try diet.  He is stable on his current regimen. He continues the current medications. He will follow up with me in 6 months. If he has any problems before then, he will contact me sooner. Thank you for allowing me to participate in the care of this patient.    On 7/20/2021, I Soon Jayden Shaw, personally transcribed this document on behalf of  Simon Velazco MD.    The documentation recorded  by the scribe accurately and completely reflects the service(s) I personally performed and the decisions made by me.              Sincerely,      Simon Velazco M.D.-Ph.D. [facility]           PAIN

## 2021-09-12 ENCOUNTER — HOSPITAL ENCOUNTER (EMERGENCY)
Facility: HOSPITAL | Age: 21
Discharge: HOME | End: 2021-09-12
Attending: EMERGENCY MEDICINE | Admitting: EMERGENCY MEDICINE
Payer: COMMERCIAL

## 2021-09-12 VITALS
DIASTOLIC BLOOD PRESSURE: 82 MMHG | OXYGEN SATURATION: 100 % | HEART RATE: 79 BPM | RESPIRATION RATE: 16 BRPM | WEIGHT: 115 LBS | SYSTOLIC BLOOD PRESSURE: 121 MMHG | BODY MASS INDEX: 19.63 KG/M2 | TEMPERATURE: 98.1 F | HEIGHT: 64 IN

## 2021-09-12 DIAGNOSIS — R05.9 COUGH: Primary | ICD-10-CM

## 2021-09-12 PROCEDURE — 99283 EMERGENCY DEPT VISIT LOW MDM: CPT

## 2021-09-12 PROCEDURE — 99284 EMERGENCY DEPT VISIT MOD MDM: CPT | Performed by: EMERGENCY MEDICINE

## 2021-09-12 RX ORDER — DOXYCYCLINE 100 MG/1
100 CAPSULE ORAL 2 TIMES DAILY
Qty: 20 CAPSULE | Refills: 0 | Status: SHIPPED | OUTPATIENT
Start: 2021-09-12 | End: 2021-09-22

## 2021-09-12 RX ORDER — ALBUTEROL SULFATE 90 UG/1
2 AEROSOL, METERED RESPIRATORY (INHALATION) ONCE
Status: COMPLETED | OUTPATIENT
Start: 2021-09-12 | End: 2021-09-12

## 2021-09-12 RX ADMIN — ALBUTEROL SULFATE 2 PUFF: 90 AEROSOL, METERED RESPIRATORY (INHALATION) at 19:45

## 2021-09-12 NOTE — Clinical Note
Eric Beltre was seen and treated in our emergency department on 9/12/2021  Diagnosis:     Alfonzo Summers  may return to work on return date  She may return on this date: 09/14/2021         If you have any questions or concerns, please don't hesitate to call        Olivier Vail MD    ______________________________           _______________          _______________  Hospital Representative                              Date                                Time

## 2021-09-12 NOTE — ED PROVIDER NOTES
History  Chief Complaint   Patient presents with    Cough     pt c/o cough x 5 days, pt was placed on prednisone with no relief     20 yo female with 5 days nonproductive cough without fever, dyspnea, hemoptysis or chest pain  No relief of cough w prednisone  Requesting abx  No leg pain or swelling  No synocpe, near syncope or weakness  No other sxs or concerns  None       History reviewed  No pertinent past medical history  Past Surgical History:   Procedure Laterality Date    BREAST SURGERY      benign tumor       History reviewed  No pertinent family history  I have reviewed and agree with the history as documented  E-Cigarette/Vaping    E-Cigarette Use Never User      E-Cigarette/Vaping Substances    Nicotine No     THC No     CBD No     Flavoring No     Other No     Unknown No      Social History     Tobacco Use    Smoking status: Never Smoker    Smokeless tobacco: Never Used   Vaping Use    Vaping Use: Never used   Substance Use Topics    Alcohol use: No    Drug use: No       Review of Systems   Constitutional: Negative for chills and fever  Respiratory: Positive for cough  Negative for shortness of breath  All other systems reviewed and are negative  Physical Exam  Physical Exam  Vitals and nursing note reviewed  Constitutional:       General: She is not in acute distress  Appearance: Normal appearance  She is well-developed  She is not ill-appearing, toxic-appearing or diaphoretic  HENT:      Head: Normocephalic and atraumatic  Eyes:      Conjunctiva/sclera: Conjunctivae normal       Pupils: Pupils are equal, round, and reactive to light  Neck:      Vascular: No JVD  Cardiovascular:      Rate and Rhythm: Normal rate and regular rhythm  Pulses: Normal pulses  Heart sounds: Normal heart sounds  No murmur heard  Pulmonary:      Effort: Pulmonary effort is normal  No respiratory distress  Breath sounds: Normal breath sounds  No stridor   No wheezing, rhonchi or rales  Chest:      Chest wall: No tenderness  Abdominal:      General: There is no distension  Palpations: Abdomen is soft  Tenderness: There is no abdominal tenderness  There is no guarding or rebound  Musculoskeletal:         General: No tenderness or deformity  Normal range of motion  Cervical back: Normal range of motion and neck supple  Skin:     General: Skin is warm and dry  Capillary Refill: Capillary refill takes less than 2 seconds  Coloration: Skin is not pale  Findings: No erythema or rash  Neurological:      General: No focal deficit present  Mental Status: She is alert and oriented to person, place, and time  Cranial Nerves: No cranial nerve deficit  Sensory: No sensory deficit  Motor: No weakness or abnormal muscle tone  Coordination: Coordination normal          Vital Signs  ED Triage Vitals [09/12/21 1848]   Temperature Pulse Respirations Blood Pressure SpO2   98 1 °F (36 7 °C) 79 16 121/82 100 %      Temp Source Heart Rate Source Patient Position - Orthostatic VS BP Location FiO2 (%)   Oral -- Sitting Left arm --      Pain Score       --           Vitals:    09/12/21 1848   BP: 121/82   Pulse: 79   Patient Position - Orthostatic VS: Sitting         Visual Acuity      ED Medications  Medications   albuterol (PROVENTIL HFA,VENTOLIN HFA) inhaler 2 puff (2 puffs Inhalation Given 9/12/21 1945)       Diagnostic Studies  Results Reviewed     None                 No orders to display              Procedures  Procedures         ED Course                             SBIRT 20yo+      Most Recent Value   SBIRT (22 yo +)   In order to provide better care to our patients, we are screening all of our patients for alcohol and drug use  Would it be okay to ask you these screening questions? Yes Filed at: 09/12/2021 1946   Initial Alcohol Screen: US AUDIT-C    1   How often do you have a drink containing alcohol?  0 Filed at: 09/12/2021 1946   2  How many drinks containing alcohol do you have on a typical day you are drinking? 0 Filed at: 09/12/2021 1946   3a  Male UNDER 65: How often do you have five or more drinks on one occasion? 0 Filed at: 09/12/2021 1946   3b  FEMALE Any Age, or MALE 65+: How often do you have 4 or more drinks on one occassion? 0 Filed at: 09/12/2021 1946   Audit-C Score  0 Filed at: 09/12/2021 1946   RODOLFO: How many times in the past year have you    Used an illegal drug or used a prescription medication for non-medical reasons? Never Filed at: 09/12/2021 1946                    MDM    Disposition  Final diagnoses:   Cough     Time reflects when diagnosis was documented in both MDM as applicable and the Disposition within this note     Time User Action Codes Description Comment    9/12/2021  7:34 PM Dorota Diallo Add [R05] Cough       ED Disposition     ED Disposition Condition Date/Time Comment    Discharge Stable Sun Sep 12, 2021  7:34 PM Wilfrid Marks discharge to home/self care  Follow-up Information    None         Discharge Medication List as of 9/12/2021  7:35 PM      START taking these medications    Details   doxycycline monohydrate (MONODOX) 100 mg capsule Take 1 capsule (100 mg total) by mouth 2 (two) times a day for 10 days, Starting Sun 9/12/2021, Until Wed 9/22/2021, Print           No discharge procedures on file      PDMP Review     None          ED Provider  Electronically Signed by           Chen England MD  09/15/21 4494

## 2022-01-12 ENCOUNTER — NURSE TRIAGE (OUTPATIENT)
Dept: OTHER | Facility: OTHER | Age: 22
End: 2022-01-12

## 2022-01-12 DIAGNOSIS — Z11.52 ENCOUNTER FOR SCREENING FOR COVID-19: Primary | ICD-10-CM

## 2022-01-12 NOTE — TELEPHONE ENCOUNTER
Reason for Disposition   [1] COVID-19 infection suspected by caller or triager AND [2] mild symptoms (cough, fever, or others) AND [3] has not gotten tested yet    Answer Assessment - Initial Assessment Questions  Were you within 6 feet or less, for up to 15 minutes or more with a person that has a confirmed COVID-19 test? Yes   What was the date of your exposure? Lives with mom and she is positive  Are you experiencing any symptoms attributed to the virus?  (Assess for SOB, cough, fever, difficulty breathing) Sore throat, cough, headache, chills  Denies SOB or chest pain  HIGH RISK: Do you have any history heart or lung conditions, weakened immune system, diabetes, Asthma, CHF, HIV, COPD, Chemo, renal failure, sickle cell, etc? Denies  PREGNANCY: Are you pregnant or did you recently give birth?  Denies  VACCINE: "Have you gotten the COVID-19 vaccine?" If Yes ask: "Which one, how many shots, when did you get it?" J&J in March x1    Protocols used: CORONAVIRUS (COVID-19) [facility]AGNOSED OR SUSPECTED[facility]

## 2022-01-12 NOTE — TELEPHONE ENCOUNTER
Regarding: Covid exposure- symptomatic  ----- Message from Sachin Steve RN sent at 1/12/2022  4:15 PM EST -----  "My mom is Covid positive and I am having sore throat, cough, HA, chills    I would like to be tested "

## 2022-01-13 PROCEDURE — U0003 INFECTIOUS AGENT DETECTION BY NUCLEIC ACID (DNA OR RNA); SEVERE ACUTE RESPIRATORY SYNDROME CORONAVIRUS 2 (SARS-COV-2) (CORONAVIRUS DISEASE [COVID-19]), AMPLIFIED PROBE TECHNIQUE, MAKING USE OF HIGH THROUGHPUT TECHNOLOGIES AS DESCRIBED BY CMS-2020-01-R: HCPCS | Performed by: FAMILY MEDICINE

## 2022-01-13 PROCEDURE — U0005 INFEC AGEN DETEC AMPLI PROBE: HCPCS | Performed by: FAMILY MEDICINE

## 2022-01-14 ENCOUNTER — TELEPHONE (OUTPATIENT)
Dept: OTHER | Facility: OTHER | Age: 22
End: 2022-01-14

## 2022-01-14 NOTE — TELEPHONE ENCOUNTER
Your test for the novel coronavirus, also known as COVID-19, was positive  The sample showed that the virus was present  Positive COVID-19 test results are reportable to the [place] Department of Health  You may receive a call from trained public health staff to conduct an interview  It is important to answer their call  They will ask you to verify who you are  During the call they will ask you about what symptoms you have, what you did before you got sick, and who you were close to while sick  The health department does this to make sure everyone stays healthy and to reduce the spread of the virus  If you would like to verify if the caller does in fact work in contact tracing, call the [address] [place] [address] at [email or web address] ([phone])  For additional information, please visit the [facility]  website: www health [email or web address] gov     If you have any additional questions, we can schedule a virtual visit for you with a provider or call the [facility] hotline [phone], option 7, for care advice    For additional information, please visit the Coronavirus FAQ on the [facility] home page ([facility]

## 2022-11-22 ENCOUNTER — EMERGENCY (EMERGENCY)
Facility: HOSPITAL | Age: 22
LOS: 1 days | Discharge: HOME | End: 2022-11-22
Attending: INTERNAL MEDICINE | Admitting: EMERGENCY MEDICINE
Payer: MEDICAID

## 2022-11-22 VITALS
HEART RATE: 111 BPM | RESPIRATION RATE: 18 BRPM | SYSTOLIC BLOOD PRESSURE: 120 MMHG | DIASTOLIC BLOOD PRESSURE: 75 MMHG | TEMPERATURE: 101 F | HEIGHT: 64 IN | OXYGEN SATURATION: 100 % | WEIGHT: 117.95 LBS

## 2022-11-22 DIAGNOSIS — N64.59 OTHER SIGNS AND SYMPTOMS IN BREAST: Chronic | ICD-10-CM

## 2022-11-22 LAB
ALBUMIN SERPL ELPH-MCNC: 4 G/DL — SIGNIFICANT CHANGE UP (ref 3.3–5)
ALP SERPL-CCNC: 66 U/L — SIGNIFICANT CHANGE UP (ref 40–120)
ALT FLD-CCNC: 19 U/L — SIGNIFICANT CHANGE UP (ref 10–45)
ANION GAP SERPL CALC-SCNC: 8 MMOL/L — SIGNIFICANT CHANGE UP (ref 5–17)
AST SERPL-CCNC: 25 U/L — SIGNIFICANT CHANGE UP (ref 10–40)
BASOPHILS # BLD AUTO: 0.03 K/UL — SIGNIFICANT CHANGE UP (ref 0–0.2)
BASOPHILS NFR BLD AUTO: 0.4 % — SIGNIFICANT CHANGE UP (ref 0–2)
BILIRUB SERPL-MCNC: 0.7 MG/DL — SIGNIFICANT CHANGE UP (ref 0.2–1.2)
BUN SERPL-MCNC: 8 MG/DL — SIGNIFICANT CHANGE UP (ref 7–23)
CALCIUM SERPL-MCNC: 9 MG/DL — SIGNIFICANT CHANGE UP (ref 8.4–10.5)
CHLORIDE SERPL-SCNC: 104 MMOL/L — SIGNIFICANT CHANGE UP (ref 96–108)
CO2 SERPL-SCNC: 27 MMOL/L — SIGNIFICANT CHANGE UP (ref 22–31)
CREAT SERPL-MCNC: 0.63 MG/DL — SIGNIFICANT CHANGE UP (ref 0.5–1.3)
EGFR: 127 ML/MIN/1.73M2 — SIGNIFICANT CHANGE UP
EOSINOPHIL # BLD AUTO: 0.03 K/UL — SIGNIFICANT CHANGE UP (ref 0–0.5)
EOSINOPHIL NFR BLD AUTO: 0.4 % — SIGNIFICANT CHANGE UP (ref 0–6)
GLUCOSE SERPL-MCNC: 87 MG/DL — SIGNIFICANT CHANGE UP (ref 70–99)
HCT VFR BLD CALC: 36.6 % — SIGNIFICANT CHANGE UP (ref 34.5–45)
HGB BLD-MCNC: 12.4 G/DL — SIGNIFICANT CHANGE UP (ref 11.5–15.5)
IMM GRANULOCYTES NFR BLD AUTO: 0.4 % — SIGNIFICANT CHANGE UP (ref 0–0.9)
LYMPHOCYTES # BLD AUTO: 0.53 K/UL — LOW (ref 1–3.3)
LYMPHOCYTES # BLD AUTO: 7.9 % — LOW (ref 13–44)
MCHC RBC-ENTMCNC: 30.9 PG — SIGNIFICANT CHANGE UP (ref 27–34)
MCHC RBC-ENTMCNC: 33.9 GM/DL — SIGNIFICANT CHANGE UP (ref 32–36)
MCV RBC AUTO: 91.3 FL — SIGNIFICANT CHANGE UP (ref 80–100)
MONOCYTES # BLD AUTO: 0.72 K/UL — SIGNIFICANT CHANGE UP (ref 0–0.9)
MONOCYTES NFR BLD AUTO: 10.7 % — SIGNIFICANT CHANGE UP (ref 2–14)
NEUTROPHILS # BLD AUTO: 5.38 K/UL — SIGNIFICANT CHANGE UP (ref 1.8–7.4)
NEUTROPHILS NFR BLD AUTO: 80.2 % — HIGH (ref 43–77)
NRBC # BLD: 0 /100 WBCS — SIGNIFICANT CHANGE UP (ref 0–0)
PLATELET # BLD AUTO: 286 K/UL — SIGNIFICANT CHANGE UP (ref 150–400)
POTASSIUM SERPL-MCNC: 4 MMOL/L — SIGNIFICANT CHANGE UP (ref 3.5–5.3)
POTASSIUM SERPL-SCNC: 4 MMOL/L — SIGNIFICANT CHANGE UP (ref 3.5–5.3)
PROT SERPL-MCNC: 7.2 G/DL — SIGNIFICANT CHANGE UP (ref 6–8.3)
RAPID RVP RESULT: SIGNIFICANT CHANGE UP
RBC # BLD: 4.01 M/UL — SIGNIFICANT CHANGE UP (ref 3.8–5.2)
RBC # FLD: 12.7 % — SIGNIFICANT CHANGE UP (ref 10.3–14.5)
SARS-COV-2 RNA SPEC QL NAA+PROBE: SIGNIFICANT CHANGE UP
SODIUM SERPL-SCNC: 139 MMOL/L — SIGNIFICANT CHANGE UP (ref 135–145)
WBC # BLD: 6.72 K/UL — SIGNIFICANT CHANGE UP (ref 3.8–10.5)
WBC # FLD AUTO: 6.72 K/UL — SIGNIFICANT CHANGE UP (ref 3.8–10.5)

## 2022-11-22 PROCEDURE — 71045 X-RAY EXAM CHEST 1 VIEW: CPT | Mod: 26

## 2022-11-22 PROCEDURE — 96374 THER/PROPH/DIAG INJ IV PUSH: CPT

## 2022-11-22 PROCEDURE — 80053 COMPREHEN METABOLIC PANEL: CPT

## 2022-11-22 PROCEDURE — 99284 EMERGENCY DEPT VISIT MOD MDM: CPT | Mod: 25

## 2022-11-22 PROCEDURE — 96361 HYDRATE IV INFUSION ADD-ON: CPT

## 2022-11-22 PROCEDURE — 85025 COMPLETE CBC W/AUTO DIFF WBC: CPT

## 2022-11-22 PROCEDURE — 36415 COLL VENOUS BLD VENIPUNCTURE: CPT

## 2022-11-22 PROCEDURE — 94640 AIRWAY INHALATION TREATMENT: CPT

## 2022-11-22 PROCEDURE — 99284 EMERGENCY DEPT VISIT MOD MDM: CPT

## 2022-11-22 PROCEDURE — 0225U NFCT DS DNA&RNA 21 SARSCOV2: CPT

## 2022-11-22 PROCEDURE — 71045 X-RAY EXAM CHEST 1 VIEW: CPT

## 2022-11-22 RX ORDER — SODIUM CHLORIDE 9 MG/ML
1000 INJECTION INTRAMUSCULAR; INTRAVENOUS; SUBCUTANEOUS ONCE
Refills: 0 | Status: COMPLETED | OUTPATIENT
Start: 2022-11-22 | End: 2022-11-22

## 2022-11-22 RX ORDER — KETOROLAC TROMETHAMINE 30 MG/ML
30 SYRINGE (ML) INJECTION ONCE
Refills: 0 | Status: DISCONTINUED | OUTPATIENT
Start: 2022-11-22 | End: 2022-11-22

## 2022-11-22 RX ORDER — FLUTICASONE PROPIONATE 50 MCG
1 SPRAY, SUSPENSION NASAL ONCE
Refills: 0 | Status: COMPLETED | OUTPATIENT
Start: 2022-11-22 | End: 2022-11-22

## 2022-11-22 RX ADMIN — Medication 30 MILLIGRAM(S): at 16:06

## 2022-11-22 RX ADMIN — SODIUM CHLORIDE 1000 MILLILITER(S): 9 INJECTION INTRAMUSCULAR; INTRAVENOUS; SUBCUTANEOUS at 16:07

## 2022-11-22 RX ADMIN — Medication 1 SPRAY(S): at 16:06

## 2022-11-22 RX ADMIN — Medication 30 MILLIGRAM(S): at 15:00

## 2022-11-22 RX ADMIN — SODIUM CHLORIDE 1000 MILLILITER(S): 9 INJECTION INTRAMUSCULAR; INTRAVENOUS; SUBCUTANEOUS at 15:00

## 2022-11-22 NOTE — ED PROVIDER NOTE - PATIENT PORTAL LINK FT
You can access the FollowMyHealth Patient Portal offered by [facility] by registering at the following website: [email or web address]. By joining [facility]’s FollowMyHealth portal, you will also be able to view your health information using other applications (apps) compatible with our system.

## 2022-11-22 NOTE — ED PROVIDER NOTE - CLINICAL SUMMARY MEDICAL DECISION MAKING FREE TEXT BOX
22 y f cc body aches chills fever + sick contacts home covid test neg  plan basic labs covid and rvp iv fluids dc 22 y f cc body aches chills fever + sick contacts home covid test neg  plan basic labs covid and rvp iv fluids dc  labs wnl, covid rvp pending

## 2022-11-22 NOTE — ED ADULT NURSE NOTE - NSFALLRSKHARMRISK_ED_ALL_ED
Physical Therapy    Visit Type: treatment  Precautions:  Medical precautions:  fall risk; standard precautions.    Lines:     Basic: O2, telemetry and capped IV  SUBJECTIVE  Patient agreed to participate in therapy this date.  \"I really do not feel well.\"  Patient / Family Goal: return to previous functional status, maximize function, return home and home exercise education    Pain     Location: R hand     At onset of session (out of 10): 7  RN informed on pain level     OBJECTIVE     Cognitive Status   Level of Consciousness   - lethargic  Arousal Alertness   - delayed responses to stimuli  Affect/Behavior    - cooperative and pleasant  Orientation    - Oriented to: person, place, time and situation  Functional Communication   - Overall Status: within functional limits   - Forms of Communication: verbal  Attention Span    - Attention: appears intact   - Attention impairment: fatigue  Following Direction   - follows one step commands with increased time  Transition Between Tasks   - transitions with cues  Safety Awareness/Insight   - good awareness of safety precautions  Awareness of Deficits   - assistance required to compensate for deficits      Sitting Balance  (ZEN = base of support)  Static      - Trial 1 details: with back unsupported and modified independent  Dynamic      - Trial 1 details: with back unsupported and modified independent    Standing Balance  (ZEN = base of support)  Firm Surface: Double Leg      - Static, Eyes Open       - Trial 1 details: supervision, with double UE support and modified independent     - Dynamic, Eyes Open       - Trial 1 details: stand by assist and with double UE support       Bed Mobility  - Repositioning in bed: modified independent  Transfers  Assistive devices: hand hold, gait belt  - Sit to stand: stand by assist  - Stand to sit: stand by assist    Ambulation / Gait  - Assistive device: hand hold and gait belt  - Distance (feet unless otherwise indicated): 1  - Assist  Level: minimal assist  - Surface: even  - Description: step to, shuffling, unsteady, decreased kenji/pace and wide base of support        Interventions     Seated    Lower Extremity: Bilateral: knee flexion, knee extensions, toe raises and heel raises, AROM, 5 reps, 2 sets  Training provided: balance retraining, body mechanics, functional ambulation, neuromuscular reeducation, positioning, safety training, transfer training and use of assistive device    Skilled input: Verbal instruction/cues and tactile instruction/cues  Verbal Consent: Writer verbally educated and received verbal consent for hand placement, positioning of patient, and techniques to be performed today from patient for clothing adjustments for techniques, therapist position for techniques and hand placement and palpation for techniques as described above and how they are pertinent to the patient's plan of care.         ASSESSMENT   Impairments: activity tolerance, balance, coordination, endurance, pain, proprioception, range of motion, safety awareness, sensation and strength  Functional Limitations: ambulation and stair climbing  Pt limited in participation due SOB and fatigue. Agreeable to perform sitting at EOB activities with min ability to reach outside her ZEN. Overall, they are presenting slightly below their functional baseline.        Discharge Recommendations  Recommendation for Discharge: PT IL: Patient is appropriate for Physical Therapy 1-3 times per week  PT/OT ADL Equipment for Discharge: None      Progress: progressing toward goals    • Skilled therapy is required to address these limitations in attempt to maximize the patient's independence.    Education:   - Present and ready to learn: patient  Education provided during session:  - Results of above outlined education: Verbalizes understanding, Demonstrates understanding and Needs reinforcement    Patient at End of Session:   Location: in chair  Safety measures: call light within  reach, equipment intact and lines intact  Handoff to: nurse    PLAN   Suggestions for next session as indicated: PT Frequency: 3-5 x per week  Frequency Comments: AK 1-3x a week, AK 2/3      Interventions: bed mobility, balance, energy conservation, functional transfer training, neuromuscular re-education, ROM, safety education and strengthening  Agreement to plan and goals: patient agrees with goals and treatment plan        GOALS  Long Term Goals: (to be met by time of discharge from hospital)  Sit to stand: Patient will complete sit to stand transfer with independent.  Status: progressing/ongoing  Stand to sit: Patient will complete stand to sit transfer with independent.  Status: progressing/ongoing  Ambulation (even): Patient will ambulate on even surface for 75 feet with least restrictive device, modified independent.   Status: progressing/ongoing    Documented in the chart in the following areas: Assessment.      Therapy procedure time and total treatment time can be found documented on the Time Entry flowsheet   no

## 2022-11-22 NOTE — ED ADULT NURSE NOTE - OBJECTIVE STATEMENT
22 yr old female to ED for complaints of flu-like symptoms. Patient complaints of body aches, chills, fevers and headaches.

## 2022-11-22 NOTE — ED PROVIDER NOTE - PHYSICAL EXAMINATION
General:     NAD, well-nourished, well-appearing  Head:     NC/AT, EOMI, oral mucosa moist  heent + nasal congestion   Neck:     trachea midline  Lungs:     CTA b/l, no w/r/r  CVS:     S1S2, RRR, no m/g/r  Abd:     +BS, s/nt/nd, no organomegaly  Ext:    2+ radial and pedal pulses, no c/c/e  Neuro: AAOx3, no sensory/motor deficits